# Patient Record
Sex: MALE | Race: WHITE
[De-identification: names, ages, dates, MRNs, and addresses within clinical notes are randomized per-mention and may not be internally consistent; named-entity substitution may affect disease eponyms.]

---
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
## 2021-08-01 NOTE — ED BACK PAIN
General


Stated Complaint:  BACK PAIN/EXPOSED TO COVID


Source of Information:  Patient





History of Present Illness


Date Seen by Provider:  Aug 1, 2021





Allergies and Home Medications


Allergies


Coded Allergies:  


     No Known Drug Allergies (Verified , 8/26/09)





Past Medical-Social-Family Hx


Past Medical History


Reproductive Disorders:  No


Sexually Transmitted Disease:  No


ADD/ADHD





Physical Exam


Vital Signs





Vital Signs - First Documented








 8/1/21





 19:18


 


Temp 36.2


 


Pulse 98


 


Resp 18


 


B/P (MAP) 120/77 (91)


 


Pulse Ox 95


 


O2 Delivery Room Air





Capillary Refill :


Height, Weight, BMI


Height: 6'5"


Weight: 250lbs. 6.0oz. 113.152703qm; 26.08 BMI


Method:Stated





Progress/Results/Core Measures


Results/Orders


Lab Results





Laboratory Tests








Test


 8/1/21


19:29 Range/Units


 


 


Influenza Type A (RT-PCR) Not Detected  Not Detecte  


 


Influenza Type B (RT-PCR) Not Detected  Not Detecte  


 


SARS-CoV-2 RNA (RT-PCR) Not Detected  Not Detecte  








My Orders





Orders - NABILA GRIFFIN


Covid 19 Inhouse Test (8/1/21 19:16)


Influenza A And B By Pcr (8/1/21 19:16)


Orphenadrine Inj (Ed Only) (Norflex Inje (8/1/21 20:00)


Ketorolac Injection (Toradol Injection) (8/1/21 20:00)





Medications Given in ED





Current Medications








 Medications  Dose


 Ordered  Sig/Blessing


 Route  Start Time


 Stop Time Status Last Admin


Dose Admin


 


 Ketorolac


 Tromethamine  60 mg  ONCE  ONCE


 IM  8/1/21 20:00


 8/1/21 20:01 DC 8/1/21 19:56


60 MG


 


 Orphenadrine


 Citrate  60 mg  ONCE  ONCE


 IM  8/1/21 20:00


 8/1/21 20:01 DC 8/1/21 19:56


60 MG








Vital Signs/I&O











 8/1/21





 19:18


 


Temp 36.2


 


Pulse 98


 


Resp 18


 


B/P (MAP) 120/77 (91)


 


Pulse Ox 95


 


O2 Delivery Room Air











Departure


Impression





   Primary Impression:  


   Back pain


Disposition:  01 HOME, SELF-CARE


Condition:  Improved





Departure-Patient Inst.


Decision time for Depature:  20:15


Referrals:  


NO,LOCAL PHYSICIAN (PCP/Family)


Primary Care Physician


Patient Instructions:  Low Back Pain  (DC)





Add. Discharge Instructions:  


Plan: 


1. Follow up with [facility] tomorrow as previous scheduled. 


2. May take Flexeril as needed every 8 hours for pain. 


3. Return for any new, concerning, or worsening symptoms.


Scripts


Cyclobenzaprine HCl (Cyclobenzaprine HCl) 10 Mg Tablet


10 MG PO Q8H PRN for SPASMS, #10 TAB 0 Refills


   Prov: NABILA GRIFFIN         8/1/21











NABILA GRIFFIN            Aug 1, 2021 19:18

## 2021-09-21 NOTE — ED COUGH/URI
General


Chief Complaint:  Cough/Cold/Flu Symptoms


Stated Complaint:  THROAT/LIPS/EYES SWELLING, CONGESTION, COUGH


Source:  patient


Exam Limitations:  no limitations


 (PETEY FITCH)





History of Present Illness


Date Seen by Provider:  Sep 21, 2021


Time Seen by Provider:  18:43


Initial Comments


To ER with a sore throat for 2 days with body aches.  Was tested at [facility] for Covid and was negative.  Has had fevers.  No cough no nausea no

vomiting no diarrhea


Timing/Duration:  constant


Severity/Quality:  moderate


 (PETEY FITCH)





Allergies and Home Medications


Allergies


Coded Allergies:  


     No Known Drug Allergies (Verified , 8/26/09)





Patient Home Medication List


Home Medication List Reviewed:  Yes


 (PETEY FITCH)


Amoxicillin (Amoxicillin) 500 Mg Capsule, 500 MG PO TID


   Prescribed by: PETEY FITCH on 9/21/21 1845


Cyclobenzaprine HCl (Cyclobenzaprine HCl) 10 Mg Tablet, 10 MG PO Q8H PRN for 

SPASMS


   Prescribed by: NABILA GRIFFIN on 8/1/21 2017


Prednisone (Prednisone) 20 Mg Tab, 40 MG PO DAILY


   Prescribed by: PETEY FITCH on 9/21/21 1845





Review of Systems


Review of Systems


Constitutional:  see HPI, fever


EENTM:  see HPI, throat pain


Respiratory:  no symptoms reported


Cardiovascular:  no symptoms reported


Genitourinary:  no symptoms reported


Musculoskeletal:  no symptoms reported


Skin:  no symptoms reported


Psychiatric/Neurological:  No Symptoms Reported


Hematologic/Lymphatic:  No Symptoms Reported


Immunological/Allergic:  no symptoms reported (PETEY FITCH)





Past Medical-Social-Family Hx


Past Medical History


Surgery/Hospitalization HX:  


denies


Reproductive Disorders:  No


Sexually Transmitted Disease:  No


ADD/ADHD


 (PETEY FITCH)





Physical Exam





Vital Signs - First Documented








 9/21/21





 18:35


 


Temp 36.6


 


Pulse 95


 


Resp 16


 


B/P (MAP) 135/83 (100)


 


Pulse Ox 96


 


O2 Delivery Room Air








 (RAMOSOLGA LIDIA K DO)


Capillary Refill :  


 (PETEY FITCH)


Height: 6'5"


Weight: 250lbs. 6.0oz. 113.064594yu; 31.00 BMI


Method:Stated


General Appearance:  WD/WN, no apparent distress


Eyes:  Bilateral Eye Normal Inspection, Bilateral Eye PERRL, Bilateral Eye EOMI


HEENT:  PERRL/EOMI, normal ENT inspection, other (Pharyngeal erythema with 

exudate)


Neck:  lymphadenopathy (R), lymphadenopathy (L)


Respiratory:  normal breath sounds, no respiratory distress, no accessory muscle

use


Cardiovascular:  regular rate, rhythm, no murmur


Gastrointestinal:  normal bowel sounds, non tender, soft


Extremities:  normal range of motion, non-tender


Neurologic/Psychiatric:  alert, normal mood/affect, oriented x 3


Skin:  normal color, warm/dry (PETEY IFTCH)





Progress/Results/Core Measures


Suspected Sepsis


SIRS


Temperature: 


Pulse:  


Respiratory Rate: 


 


Blood Pressure  / 


Mean: 


 


 (PETEY FITCH)





Results/Orders


Lab Results





Laboratory Tests








Test


 9/21/21


18:40 9/21/21


18:55 Range/Units


 


 


Influenza Type A Antigen NEGATIVE   NEGATIVE  


 


Influenza Type B Antigen NEGATIVE   NEGATIVE  


 


Group A Streptococcus Screen  NEGATIVE  NEGATIVE  





 (OLGA LIDIA BEASLEY DO)


Micro Results





Microbiology


9/21/21 Throat Culture - Preliminary, Resulted


          No Beta Strep isolated


 (OLGA LIDIA BEASLEY DO)


Vital Signs/I&O











 9/21/21 9/21/21





 18:35 19:39


 


Temp 36.6 36.5


 


Pulse 95 89


 


Resp 16 16


 


B/P (MAP) 135/83 (100) 135/82


 


Pulse Ox 96 95


 


O2 Delivery Room Air Room Air








 (OLGA LIDIA BEASLEY DO)


Vital Signs/I&O


Capillary Refill :  


 (PETEY FITCH)





Departure


Impression





   Primary Impression:  


   Pharyngitis


Disposition:  01 HOME, SELF-CARE


Condition:  Stable





Departure-Patient Inst.


Decision time for Depature:  18:44


 (PETEY FITCH)


Referrals:  


NO,LOCAL PHYSICIAN (PCP/Family)


Primary Care Physician


Patient Instructions:  Sore Throat, Adult ED





Add. Discharge Instructions:  


1.  Medication as directed


2.  Return to ER for any concerns


3.  Follow-up with your doctor later this week for recheck.





All discharge instructions reviewed with patient and/or family. Voiced 

understanding.


Scripts


Prednisone (Prednisone) 20 Mg Tab


40 MG PO DAILY, #6 TAB 0 Refills


   Prov: PETEY FITCH         9/21/21 


Amoxicillin (Amoxicillin) 500 Mg Capsule


500 MG PO TID, #21 CAP 0 Refills


   Prov: PETEY FITCH         9/21/21








ATTENDING PHYSICIAN NOTE:


I WAS PHYSICALLY PRESENT AS ER PHYSICIAN WHEN THIS PATIENT WAS IN ER, BUT I WAS 

NOT INVOLVED IN DECISION MAKING OR ANY CARE OF THIS PATIENT. 


 (OLGA LIDIA BEASLEY DO)











PETEY FITCH             Sep 21, 2021 18:45


OLGA LIDIA BEASLEY DO                 Sep 23, 2021 06:35

## 2021-09-23 NOTE — ED EENT
History of Present Illness


General


Chief Complaint:  Oral/Throat Problems


Stated Complaint:  DX: THRUSH / ANXIETY


Nursing Triage Note:  


PT AMB TO [facility] W C/O LIPS, TONGUE, AND MOUTH SWELLING. REPORTS HE WAS DX W 


THRUSH.


Source:  patient


Exam Limitations:  no limitations





History of Present Illness


Date Seen by Provider:  Sep 23, 2021


Time Seen by Provider:  18:25


Initial Comments


To ER with reports of lips tongue and mouth pain and swelling.  I saw him here 

in the ER 2 days ago at that time he had some erythema to the tonsillar pillars 

and soft palate.  Nothing to the tongue or buccal mucosa.  I gave him 

amoxicillin and prednisone.  He filled that yesterday.  He also was seen at 

[facility] and was diagnosed with thrush and was given some liquid for 

that.  He comes in today for worsening symptoms to the point that he is unable 

to swallow because of the pain.


Timing/Duration:  gradual


Severity:  moderate


Location:  mouth


Prearrival Treatment:  no prearrival treatment


Associated Symptoms:  denies symptoms





Allergies and Home Medications


Allergies


Coded Allergies:  


     No Known Drug Allergies (Verified , 8/26/09)





Patient Home Medication List


Home Medication List Reviewed:  Yes


Amoxicillin (Amoxicillin) 500 Mg Capsule, 500 MG PO TID


   Prescribed by: PETEY FITCH on 9/21/21 1845


Cyclobenzaprine HCl (Cyclobenzaprine HCl) 10 Mg Tablet, 10 MG PO Q8H PRN for 

SPASMS


   Prescribed by: NABILA GRIFFIN on 8/1/21 2017


Prednisone (Prednisone) 20 Mg Tab, 40 MG PO DAILY


   Prescribed by: PETEY FITCH on 9/21/21 1845


Valacyclovir HCl (Valtrex) 1,000 Mg Tablet, 1,000 MG PO BID


   Prescribed by: PETEY FITCH on 9/23/21 1833





Review of Systems


Review of Systems


Constitutional:  see HPI


Eyes:  No Symptoms Reported


Ears:  No Symptoms Reported


Nose:  no symptoms reported


Mouth:  see HPI


Throat:  see HPI


Respiratory:  no symptoms reported


Cardiovascular:  no symptoms reported


Musculoskeletal:  no symptoms reported





Past Medical-Social-Family Hx


Patient Social History


Tobacco Use?:  Yes


Tobacco type used:  Cigarettes


Smoking Status:  Current Everyday Smoker


Use of E-Cig and/or Vaping dev:  No


Substance use?:  No


Alcohol Use?:  No





Past Medical History


Surgery/Hospitalization HX:  


denies


Reproductive Disorders:  No


Sexually Transmitted Disease:  No


ADD/ADHD





Physical Exam


Vital Signs





Vital Signs - First Documented








 9/23/21





 17:57


 


Temp 35.9


 


Pulse 118


 


Resp 18


 


B/P (MAP) 133/83 (100)


 


Pulse Ox 97


 


O2 Delivery Room Air








Height, Weight, BMI


Height: 6'5"


Weight: 250lbs. 6.0oz. 113.442022vl; 29.00 BMI


Method:Stated


General Appearance:  WD/WN, no apparent distress


Eyes:  bilateral eye normal inspection, bilateral eye PERRL, bilateral eye EOMI


Ears:  bilateral ear auricle normal, bilateral ear canal normal, bilateral ear 

TM normal


Mouth/Throat:  other (Does have continued erythema of the tonsillar pillars and 

soft palate with some ulcerative lesions within this.  He does have some 

erythema and whitish material on the tongue consistent with oral candidiasis.  )


Neck:  non-tender, full range of motion, lymphadenopathy (R), lymphadenopathy 

(L)


Cardiovascular:  no murmur, tachycardia


Respiratory:  no respiratory distress, no accessory muscle use


Gastrointestinal:  normal bowel sounds, non tender


Neurologic/Psychiatric:  alert, normal mood/affect, oriented x 3





Progress/Results/Core Measures


Results/Orders


Lab Results





Laboratory Tests








Test


 9/23/21


18:30 Range/Units


 


 


White Blood Count


 10.8 


 4.3-11.0


10^3/uL


 


Red Blood Count


 5.45 


 4.30-5.52


10^6/uL


 


Hemoglobin 15.9  13.3-17.7  g/dL


 


Hematocrit 48  40-54  %


 


Mean Corpuscular Volume 89  80-99  fL


 


Mean Corpuscular Hemoglobin 29  25-34  pg


 


Mean Corpuscular Hemoglobin


Concent 33 


 32-36  g/dL





 


Red Cell Distribution Width 12.7  10.0-14.5  %


 


Platelet Count


 352 


 130-400


10^3/uL


 


Mean Platelet Volume 8.7 L 9.0-12.2  fL


 


Immature Granulocyte % (Auto) 1   %


 


Neutrophils (%) (Auto) 63  42-75  %


 


Lymphocytes (%) (Auto) 18  12-44  %


 


Monocytes (%) (Auto) 15 H 0-12  %


 


Eosinophils (%) (Auto) 3  0-10  %


 


Basophils (%) (Auto) 1  0-10  %


 


Neutrophils # (Auto)


 6.7 


 1.8-7.8


10^3/uL


 


Lymphocytes # (Auto)


 1.9 


 1.0-4.0


10^3/uL


 


Monocytes # (Auto)


 1.7 H


 0.0-1.0


10^3/uL


 


Eosinophils # (Auto)


 0.3 


 0.0-0.3


10^3/uL


 


Basophils # (Auto)


 0.1 


 0.0-0.1


10^3/uL


 


Immature Granulocyte # (Auto)


 0.1 


 0.0-0.1


10^3/uL


 


Sodium Level 138  135-145  MMOL/L


 


Potassium Level 4.2  3.6-5.0  MMOL/L


 


Chloride Level 103    MMOL/L


 


Carbon Dioxide Level 22  21-32  MMOL/L


 


Anion Gap 13  5-14  MMOL/L


 


Blood Urea Nitrogen 16  7-18  MG/DL


 


Creatinine


 0.93 


 0.60-1.30


MG/DL


 


Estimat Glomerular Filtration


Rate 94 


  





 


BUN/Creatinine Ratio 17   


 


Glucose Level 108 H   MG/DL


 


Calcium Level 9.4  8.5-10.1  MG/DL


 


Corrected Calcium 9.6  8.5-10.1  MG/DL


 


Total Bilirubin 0.4  0.1-1.0  MG/DL


 


Aspartate Amino Transf


(AST/SGOT) 19 


 5-34  U/L





 


Alanine Aminotransferase


(ALT/SGPT) 32 


 0-55  U/L





 


Alkaline Phosphatase 81    U/L


 


C-Reactive Protein High


Sensitivity 3.45 H


 0.00-0.50


MG/DL


 


Total Protein 7.3  6.4-8.2  GM/DL


 


Albumin 3.8  3.2-4.5  GM/DL








My Orders





Orders - PETEY FITCH APRN


Cbc With Automated Diff (9/23/21 18:17)


Hs C Reactive Protein (9/23/21 18:17)


Comprehensive Metabolic Panel (9/23/21 18:17)


Ed Iv/Invasive Line Start (9/23/21 18:17)


Lactated Ringers (Lr 1000 Ml Iv Solution (9/23/21 18:30)


Ketorolac Injection (Toradol Injection) (9/23/21 18:30)


Lidocaine 2% Viscous 15 Ml (Xylocaine Vi (9/23/21 18:30)





Medications Given in ED





Current Medications








 Medications  Dose


 Ordered  Sig/Blessing


 Route  Start Time


 Stop Time Status Last Admin


Dose Admin


 


 Ketorolac


 Tromethamine  15 mg  ONCE  ONCE


 IVP  9/23/21 18:30


 9/23/21 18:31 DC 9/23/21 18:38


15 MG


 


 Lidocaine HCl  15 ml  ONCE  ONCE


 PO  9/23/21 18:30


 9/23/21 18:31 DC 9/23/21 18:38


15 ML








Vital Signs/I&O











 9/23/21





 17:57


 


Temp 35.9


 


Pulse 118


 


Resp 18


 


B/P (MAP) 133/83 (100)


 


Pulse Ox 97


 


O2 Delivery Room Air














Blood Pressure Mean:                    100











Departure


Impression





   Primary Impression:  


   Viral pharyngitis


Disposition:  01 HOME, SELF-CARE


Condition:  Stable





Departure-Patient Inst.


Decision time for Depature:  18:32


Referrals:  


[facility] (PCP/Family)


Primary Care Physician


Patient Instructions:  Viral Pharyngitis  (DC)





Add. Discharge Instructions:  








All discharge instructions reviewed with patient and/or family. Voiced 

understanding.


Scripts


Valacyclovir HCl (Valtrex) 1,000 Mg Tablet


1000 MG PO BID, #14 TAB


   Prov: PETEY FITCH         9/23/21











PETEY FITCH             Sep 23, 2021 18:27
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
## 2022-01-02 NOTE — ED GENERAL
General


Chief Complaint:  General Problems/Pain


Stated Complaint:  LIPS SWOLLEN, UNABLE TO EAT


Nursing Triage Note:  


PATIENT WAS BROUGHT TO ROOM FROM WAITING  ROOM WHERE HE HAD BEEN YELLING THAT 


HIS TROAT WAS  CLOSING OFF. ON ADMIT APPEARS TO BE HEYPRVENTLATING. LIPS SWOLLEN




REPORTS DX WITH HERPES AND PUT ON VALTREX  REORTS NOT ANY BETTER WAS AT [facility] AND 


LEFT BECAUSE HE WAS NOT SEE FAST ENOUGHT.





History of Present Illness


Date Seen by Provider:  Jan 2, 2022


Time Seen by Provider:  17:00


Initial Comments


36-year-old  male patient presents for pain on his lips and mouth due 

to herpetic exacerbation.  Patient is yelling and moaning about his pain.  He 

was at [facility] prior to arrival and came here because of the wait time there.  He 

had been started on Valtrex by them 4 days ago and took his last pill yesterday.

 He has not taken any ibuprofen or Tylenol for the symptoms.  He denies any 

other complaints.  He is able to take with water and swallow with no discomfort.


Timing/Duration:  12-24 Hours


Severity:  Moderate


Associated Systoms:  Denies Symptoms





Allergies and Home Medications


Allergies


Coded Allergies:  


     No Known Drug Allergies (Verified , 8/26/09)





Patient Home Medication List


Home Medication List Reviewed:  Yes


Amoxicillin (Amoxicillin) 500 Mg Capsule, 500 MG PO TID


   Prescribed by: PETEY FITCH on 9/21/21 1845


Cyclobenzaprine HCl (Cyclobenzaprine HCl) 10 Mg Tablet, 10 MG PO Q8H PRN for 

SPASMS


   Prescribed by: NABILA GRIFFIN on 8/1/21 2017


Prednisone (Prednisone) 20 Mg Tab, 40 MG PO DAILY


   Prescribed by: PETEY FITCH on 9/21/21 1845


Valacyclovir HCl (Valtrex) 1,000 Mg Tablet, 1,000 MG PO BID


   Prescribed by: PETEY FITCH on 9/23/21 1833





Review of Systems


Review of Systems


Constitutional:  no symptoms reported, see HPI


EENTM:  see HPI, mouth pain, mouth swelling, throat pain


Respiratory:  no symptoms reported, see HPI


Genitourinary:  see HPI, pain (due to herpetic lesions)





All Other Systems Reviewed


Negative Unless Noted:  Yes





Past Medical-Social-Family Hx


Patient Social History


Tobacco Use?:  Yes


Substance use?:  Yes


Substance type:  Methamphetamine


Alcohol Use?:  Yes





Past Medical History


Surgery/Hospitalization HX:  


denies


Reproductive Disorders:  No


Sexually Transmitted Disease:  No


ADD/ADHD





Family Medical History


Reviewed Nursing Family Hx





Physical Exam


Vital Signs





Vital Signs - First Documented








 1/2/22 1/2/22





 16:36 17:27


 


Temp 35.1 


 


Pulse 90 


 


Resp 18 


 


B/P (MAP) 154/92 (112) 


 


Pulse Ox 98 


 


O2 Delivery  Room Air





Capillary Refill : Less Than 3 Seconds


Height, Weight, BMI


Height: 6'5"


Weight: 250lbs. 6.0oz. [id number]; 25.00 BMI


Method:Stated


General Appearance:  Anxious, Moderate Distress


Eyes:  Bilateral Eye Normal Inspection, Bilateral Eye PERRL, Bilateral Eye EOMI


HEENT:  PERRL/EOMI, Pharynx Normal, Moist Mucous Membranes, Other (mild swelling

with lesions compatible to herpes noted to lips and mouth)


Neck:  Full Range of Motion, Normal Inspection, Non Tender, Supple


Respiratory:  Chest Non Tender, Lungs Clear, Normal Breath Sounds


Cardiovascular:  Regular Rate, Rhythm, No Murmur, Normal Peripheral Pulses


Neurologic/Psychiatric:  Alert, Oriented x3, No Motor/Sensory Deficits





Progress/Results/Core Measures


Suspected Sepsis


SIRS


Temperature: 


Pulse: 90 


Respiratory Rate: 18


 


Blood Pressure 154 /92 


Mean: 72





Results/Orders


My Orders





Orders - JON MAN


Valacyclovir Tablet (Valtrex Tablet) (1/2/22 17:39)


Acetaminophen Tablet/Caplet (Tylenol  T (1/2/22 17:41)





Vital Signs/I&O











 1/2/22 1/2/22 1/2/22





 16:36 17:27 17:45


 


Temp 35.1  


 


Pulse 90 95 90


 


Resp 18 18 18


 


B/P (MAP) 154/92 (112) 122/48 122/48


 


Pulse Ox 98 98 98


 


O2 Delivery  Room Air Room Air





Capillary Refill : Less Than 3 Seconds








Blood Pressure Mean:                    72








Progress Note :  


   Time:  17:00


Progress Note


Upon entering patients room, he had his penis exposed and was rubbing it, he was

yelling about the herpes pain causing so much discomfort, but rubbing it was 

helping with the pain. Patient instructed to keep his pants on appropriately and

if he continued to have indecent exposure, he would be asked to leave the ED. He

continued to yell and moan about the pain. Agreed to provide Valtrex but he 

refused to get a Rx sent to the pharmacy, as he needs his Rx at [facility]. 

Patient asking if he could leave the ED and go to [facility] for his medications upon 

discharge. 


1735 registration staff and ED tech went to room, he had his pants partially 

removed and was exposing himself, refused to cover himself with a blanket or 

keep his pants on appropriately. He was yelling and cursing at the staff. 

Nursing staff obtaining medications and patient discharge papers. 


1740 patient ambulated out of room, cursing about lack of care he has received 

and walked out of AMA. He refused to sign forms or take his medications that the

nurse was attempting to give him. He did not take his Discharge Instructions.





Departure


Impression





   Primary Impression:  


   Herpes genitalis


   Qualified Codes:  A60.01 - Herpesviral infection of penis


   Additional Impression:  


   Oral herpes


Disposition:  07 AGAINST MEDICAL ADVICE


Condition:  Stable





Departure-Patient Inst.


Decision time for Depature:  17:40


Referrals:  


[facility] (PCP/Family)


Primary Care Physician


Patient Instructions:  Cold Sores (Oral Herpes) (DC), Genital Herpes (DC)





Add. Discharge Instructions:  


Follow-up at [facility] for continuation of your medication. 


Use Tylenol and over-the-counter herpetic medication for symptoms.


Return to the emergency department for new, urgent healthcare needs








All discharge instructions reviewed with patient and/or family. Voiced 

understanding.





Copy


Copies To 1:   SAMEER MCKINNON AMY ARNP                   Jan 2, 2022 17:46

## 2022-05-20 ENCOUNTER — HOSPITAL ENCOUNTER (OUTPATIENT)
Dept: HOSPITAL 75 - ER | Age: 37
Setting detail: OBSERVATION
LOS: 1 days | Discharge: HOME | End: 2022-05-21
Attending: INTERNAL MEDICINE | Admitting: INTERNAL MEDICINE
Payer: SELF-PAY

## 2022-05-20 VITALS — SYSTOLIC BLOOD PRESSURE: 106 MMHG | DIASTOLIC BLOOD PRESSURE: 62 MMHG

## 2022-05-20 VITALS — SYSTOLIC BLOOD PRESSURE: 109 MMHG | DIASTOLIC BLOOD PRESSURE: 72 MMHG

## 2022-05-20 VITALS — HEIGHT: 73.62 IN | BODY MASS INDEX: 38.32 KG/M2 | WEIGHT: 295.42 LBS

## 2022-05-20 VITALS — DIASTOLIC BLOOD PRESSURE: 65 MMHG | SYSTOLIC BLOOD PRESSURE: 110 MMHG

## 2022-05-20 VITALS — SYSTOLIC BLOOD PRESSURE: 109 MMHG | DIASTOLIC BLOOD PRESSURE: 79 MMHG

## 2022-05-20 VITALS — DIASTOLIC BLOOD PRESSURE: 67 MMHG | SYSTOLIC BLOOD PRESSURE: 107 MMHG

## 2022-05-20 VITALS — SYSTOLIC BLOOD PRESSURE: 123 MMHG | DIASTOLIC BLOOD PRESSURE: 80 MMHG

## 2022-05-20 VITALS — DIASTOLIC BLOOD PRESSURE: 67 MMHG | SYSTOLIC BLOOD PRESSURE: 105 MMHG

## 2022-05-20 VITALS — SYSTOLIC BLOOD PRESSURE: 106 MMHG | DIASTOLIC BLOOD PRESSURE: 63 MMHG

## 2022-05-20 VITALS — DIASTOLIC BLOOD PRESSURE: 77 MMHG | SYSTOLIC BLOOD PRESSURE: 119 MMHG

## 2022-05-20 VITALS — DIASTOLIC BLOOD PRESSURE: 79 MMHG | SYSTOLIC BLOOD PRESSURE: 116 MMHG

## 2022-05-20 VITALS — DIASTOLIC BLOOD PRESSURE: 68 MMHG | SYSTOLIC BLOOD PRESSURE: 101 MMHG

## 2022-05-20 DIAGNOSIS — F12.10: ICD-10-CM

## 2022-05-20 DIAGNOSIS — F15.10: ICD-10-CM

## 2022-05-20 DIAGNOSIS — Z20.822: ICD-10-CM

## 2022-05-20 DIAGNOSIS — G93.1: ICD-10-CM

## 2022-05-20 DIAGNOSIS — F19.10: ICD-10-CM

## 2022-05-20 DIAGNOSIS — R09.02: ICD-10-CM

## 2022-05-20 DIAGNOSIS — Z79.52: ICD-10-CM

## 2022-05-20 DIAGNOSIS — J98.11: ICD-10-CM

## 2022-05-20 DIAGNOSIS — T43.621A: Primary | ICD-10-CM

## 2022-05-20 DIAGNOSIS — F14.10: ICD-10-CM

## 2022-05-20 LAB
ALBUMIN SERPL-MCNC: 4.2 GM/DL (ref 3.2–4.5)
ALP SERPL-CCNC: 96 U/L (ref 40–136)
ALT SERPL-CCNC: 39 U/L (ref 0–55)
AMORPH SED URNS QL MICRO: (no result) /LPF
APAP SERPL-MCNC: < 10 UG/ML (ref 10–30)
APTT BLD: 21 SEC (ref 24–35)
APTT PPP: YELLOW S
BACTERIA #/AREA URNS HPF: (no result) /HPF
BARBITURATES UR QL: NEGATIVE
BASOPHILS # BLD AUTO: 0.1 10^3/UL (ref 0–0.1)
BASOPHILS NFR BLD AUTO: 0 % (ref 0–10)
BASOPHILS NFR BLD MANUAL: 0 %
BENZODIAZ UR QL SCN: NEGATIVE
BILIRUB SERPL-MCNC: 0.3 MG/DL (ref 0.1–1)
BILIRUB UR QL STRIP: NEGATIVE
BUN/CREAT SERPL: 15
CALCIUM SERPL-MCNC: 9.4 MG/DL (ref 8.5–10.1)
CHLORIDE SERPL-SCNC: 106 MMOL/L (ref 98–107)
CO2 SERPL-SCNC: 24 MMOL/L (ref 21–32)
COCAINE UR QL: NEGATIVE
CREAT SERPL-MCNC: 1.27 MG/DL (ref 0.6–1.3)
EOSINOPHIL # BLD AUTO: 0.1 10^3/UL (ref 0–0.3)
EOSINOPHIL NFR BLD AUTO: 1 % (ref 0–10)
EOSINOPHIL NFR BLD MANUAL: 3 %
FIBRINOGEN PPP-MCNC: CLEAR MG/DL
GFR SERPLBLD BASED ON 1.73 SQ M-ARVRAT: 76 ML/MIN
GLUCOSE SERPL-MCNC: 134 MG/DL (ref 70–105)
GLUCOSE UR STRIP-MCNC: NEGATIVE MG/DL
GRAN CASTS #/AREA URNS LPF: (no result) /LPF
HCT VFR BLD CALC: 48 % (ref 40–54)
HGB BLD-MCNC: 15 G/DL (ref 13.3–17.7)
INR PPP: 0.9 (ref 0.8–1.4)
KETONES UR QL STRIP: NEGATIVE
LEUKOCYTE ESTERASE UR QL STRIP: NEGATIVE
LYMPHOCYTES # BLD AUTO: 0.8 10^3/UL (ref 1–4)
LYMPHOCYTES NFR BLD AUTO: 6 % (ref 12–44)
MAGNESIUM SERPL-MCNC: 2.4 MG/DL (ref 1.6–2.4)
MANUAL DIFFERENTIAL PERFORMED BLD QL: YES
MCH RBC QN AUTO: 29 PG (ref 25–34)
MCHC RBC AUTO-ENTMCNC: 31 G/DL (ref 32–36)
MCV RBC AUTO: 92 FL (ref 80–99)
METHADONE UR QL SCN: NEGATIVE
MONOCYTES # BLD AUTO: 1 10^3/UL (ref 0–1)
MONOCYTES NFR BLD AUTO: 7 % (ref 0–12)
MONOCYTES NFR BLD: 8 %
NEUTROPHILS # BLD AUTO: 12.4 10^3/UL (ref 1.8–7.8)
NEUTROPHILS NFR BLD AUTO: 86 % (ref 42–75)
NEUTS BAND NFR BLD MANUAL: 80 %
NEUTS BAND NFR BLD: 4 %
NITRITE UR QL STRIP: NEGATIVE
OPIATES UR QL SCN: NEGATIVE
OXYCODONE UR QL: NEGATIVE
PH UR STRIP: 6 [PH] (ref 5–9)
PLATELET # BLD: 309 10^3/UL (ref 130–400)
PMV BLD AUTO: 8.6 FL (ref 9–12.2)
POTASSIUM SERPL-SCNC: 4 MMOL/L (ref 3.6–5)
PROPOXYPH UR QL: NEGATIVE
PROT SERPL-MCNC: 7.3 GM/DL (ref 6.4–8.2)
PROT UR QL STRIP: (no result)
PROTHROMBIN TIME: 12.4 SEC (ref 12.2–14.7)
RBC #/AREA URNS HPF: (no result) /HPF
RBC MORPH BLD: NORMAL
SALICYLATES SERPL-MCNC: < 5 MG/DL (ref 5–20)
SODIUM SERPL-SCNC: 143 MMOL/L (ref 135–145)
SP GR UR STRIP: >=1.03 (ref 1.02–1.02)
SQUAMOUS #/AREA URNS HPF: (no result) /HPF
TRICYCLICS UR QL SCN: NEGATIVE
VARIANT LYMPHS NFR BLD MANUAL: 5 %
WBC # BLD AUTO: 14.5 10^3/UL (ref 4.3–11)
WBC #/AREA URNS HPF: (no result) /HPF

## 2022-05-20 PROCEDURE — 99285 EMERGENCY DEPT VISIT HI MDM: CPT

## 2022-05-20 PROCEDURE — 93041 RHYTHM ECG TRACING: CPT

## 2022-05-20 PROCEDURE — 83605 ASSAY OF LACTIC ACID: CPT

## 2022-05-20 PROCEDURE — 36415 COLL VENOUS BLD VENIPUNCTURE: CPT

## 2022-05-20 PROCEDURE — 85610 PROTHROMBIN TIME: CPT

## 2022-05-20 PROCEDURE — 82550 ASSAY OF CK (CPK): CPT

## 2022-05-20 PROCEDURE — 87636 SARSCOV2 & INF A&B AMP PRB: CPT

## 2022-05-20 PROCEDURE — 80320 DRUG SCREEN QUANTALCOHOLS: CPT

## 2022-05-20 PROCEDURE — 85025 COMPLETE CBC W/AUTO DIFF WBC: CPT

## 2022-05-20 PROCEDURE — 93005 ELECTROCARDIOGRAM TRACING: CPT

## 2022-05-20 PROCEDURE — 85007 BL SMEAR W/DIFF WBC COUNT: CPT

## 2022-05-20 PROCEDURE — 87088 URINE BACTERIA CULTURE: CPT

## 2022-05-20 PROCEDURE — 87205 SMEAR GRAM STAIN: CPT

## 2022-05-20 PROCEDURE — 72125 CT NECK SPINE W/O DYE: CPT

## 2022-05-20 PROCEDURE — 85730 THROMBOPLASTIN TIME PARTIAL: CPT

## 2022-05-20 PROCEDURE — 83874 ASSAY OF MYOGLOBIN: CPT

## 2022-05-20 PROCEDURE — 71045 X-RAY EXAM CHEST 1 VIEW: CPT

## 2022-05-20 PROCEDURE — 96376 TX/PRO/DX INJ SAME DRUG ADON: CPT

## 2022-05-20 PROCEDURE — 87070 CULTURE OTHR SPECIMN AEROBIC: CPT

## 2022-05-20 PROCEDURE — 81000 URINALYSIS NONAUTO W/SCOPE: CPT

## 2022-05-20 PROCEDURE — 85027 COMPLETE CBC AUTOMATED: CPT

## 2022-05-20 PROCEDURE — 87040 BLOOD CULTURE FOR BACTERIA: CPT

## 2022-05-20 PROCEDURE — 70450 CT HEAD/BRAIN W/O DYE: CPT

## 2022-05-20 PROCEDURE — 84484 ASSAY OF TROPONIN QUANT: CPT

## 2022-05-20 PROCEDURE — 84145 PROCALCITONIN (PCT): CPT

## 2022-05-20 PROCEDURE — 84100 ASSAY OF PHOSPHORUS: CPT

## 2022-05-20 PROCEDURE — 80053 COMPREHEN METABOLIC PANEL: CPT

## 2022-05-20 PROCEDURE — 86141 C-REACTIVE PROTEIN HS: CPT

## 2022-05-20 PROCEDURE — 80306 DRUG TEST PRSMV INSTRMNT: CPT

## 2022-05-20 PROCEDURE — 80329 ANALGESICS NON-OPIOID 1 OR 2: CPT

## 2022-05-20 PROCEDURE — 83735 ASSAY OF MAGNESIUM: CPT

## 2022-05-20 RX ADMIN — SODIUM CHLORIDE, SODIUM LACTATE, POTASSIUM CHLORIDE, AND CALCIUM CHLORIDE SCH MLS/HR: 600; 310; 30; 20 INJECTION, SOLUTION INTRAVENOUS at 23:48

## 2022-05-20 RX ADMIN — SODIUM CHLORIDE SCH MLS/HR: 900 INJECTION INTRAVENOUS at 18:51

## 2022-05-20 RX ADMIN — SODIUM CHLORIDE, SODIUM LACTATE, POTASSIUM CHLORIDE, AND CALCIUM CHLORIDE SCH MLS/HR: 600; 310; 30; 20 INJECTION, SOLUTION INTRAVENOUS at 14:30

## 2022-05-20 NOTE — DIAGNOSTIC IMAGING REPORT
INDICATION: Cough and hypoxia, found unresponsive.



Frontal chest obtained at 10:42 a.m. There is no prior study for

comparison.



FINDINGS: There is cardiomegaly. There is poor inspiration with

mild central vascular prominence. There is some minimal

atelectatic change or infiltrate in the left base.



IMPRESSION:



Poor inspiration with cardiomegaly and mild central vascular

prominence. Minimal infiltrate or atelectasis in left base. No

pneumothorax seen.



Dictated by: 



  Dictated on workstation # [id number]

## 2022-05-20 NOTE — ED NEUROLOGICAL PROBLEM
General


Chief Complaint:  Unresponsive


Stated Complaint:  UNRESPONSIVE


Nursing Triage Note:  


ARRIVED VIA EMS FROM HOME. FOUND UNRESPONSIVE SITTING ON THE FLOOR.  UPON 


ARRIVAL PT LETHARGIC ET WILL OPEN EYES AND ANSWER TO SOME QUESTIONS. EMS REPORTS




GIVING NARCAN IN ROUTE.


Source:  patient, police, EMS


Exam Limitations:  no limitations





History of Present Illness


Date Seen by Provider:  May 20, 2022


Time Seen by Provider:  10:10


Initial Comments


This 36-year-old man is brought to the emergency room by [facility] 

with chief complaint of unresponsive episode.  EMS reports he was found lying on

the floor face up with his head against a chair such that his neck was flexed 

anteriorly.  He was hypoxic with an oxygen saturation of 70% on room air and 

minimally responsive.  Narcan 2 mg IV was administered and nonrebreather mask 

was applied.  Mental status was improving, he was protecting his airway, and 

oxygen saturation improved.  Multiple injection syringes were found on the floor

around him and track marks were noted on his arms.  Police noted that this 

patient has been known to be involved with heroin, cocaine, methamphetamine, 

marijuana, fentanyl, etc.  Patient eventually wakes and talks to us during 

assessment.  He states he does not know what substance he injected.  He was not 

very responsive until we attempted to place a Quinteros catheter and then he became 

very talkative and responsive.  There is no known trauma but c-collar was placed

as a precaution.  Patient coughed up a significant clump of bloody green sputum.





Allergies and Home Medications


Allergies


Coded Allergies:  


     No Known Drug Allergies (Verified , 09)





Patient Home Medication List


Home Medication List Reviewed:  Yes


Amoxicillin (Amoxicillin) 500 Mg Capsule, 500 MG PO TID


   Prescribed by: PETEY FITCH on 21 184


Cyclobenzaprine HCl (Cyclobenzaprine HCl) 10 Mg Tablet, 10 MG PO Q8H PRN for 

SPASMS


   Prescribed by: NABILA GRIFFIN on 21


Prednisone (Prednisone) 20 Mg Tab, 40 MG PO DAILY


   Prescribed by: PETEY FITCH on 21 184


Valacyclovir HCl (Valtrex) 1,000 Mg Tablet, 1,000 MG PO BID


   Prescribed by: PETEY FITCH on 21 1833





Review of Systems


Review of Systems


Constitutional:  see HPI


Eyes:  No Symptoms Reported


Ears, Nose, Mouth, Throat:  no symptoms reported


Respiratory:  see HPI


Cardiovascular:  no symptoms reported


Gastrointestinal:  no symptoms reported


Genitourinary:  no symptoms reported


Musculoskeletal:  no symptoms reported


Skin:  no symptoms reported


Psychiatric/Neurological:  See HPI


Endocrine:  No Symptoms Reported


Hematologic/Lymphatic:  No Symptoms Reported





Past Medical-Social-Family Hx


Patient Social History


Tobacco Use?:  Yes


Substance use?:  Yes


Substance type:  Methamphetamine, Marijuana, Other (Heroin, cocaine)


Alcohol Use?:  No (Not known)





Past Medical History


Surgery/Hospitalization HX:  


denies


Surgeries:  Yes (Trauma laceration repair)


Respiratory:  No


Cardiac:  No


Neurological:  No


Reproductive Disorders:  No


Sexually Transmitted Disease:  No


Gastrointestinal:  No


Musculoskeletal:  No


Endocrine:  No


HEENT:  No


Cancer:  No


Psychosocial:  Yes (Polysubstance abuse)


ADD/ADHD





Physical Exam


Vital Signs





Vital Signs - First Documented








 22





 10:09 13:10


 


Temp 34.8 


 


Pulse 96 


 


Resp 13 


 


B/P (MAP) 114/59 (77) 


 


Pulse Ox 100 


 


O2 Delivery Non Rebreather 


 


O2 Flow Rate  2.00





Capillary Refill :


Height, Weight, BMI


Height: 6'5"


Weight: 250lbs. 6.0oz. [id number]; 35.00 BMI


Method:Stated


General Appearance:  WD/WN, no apparent distress, other (Decreased responsiven

ess)


HEENT:  PERRL/EOMI, normal ENT inspection


Neck:  non-tender, normal inspection


Respiratory:  lungs clear, normal breath sounds, no respiratory distress


Cardiovascular:  regular rate, rhythm, no edema, no murmur


Gastrointestinal:  normal bowel sounds, non tender, soft


Extremities:  normal inspection, no pedal edema


Neurologic/Psychiatric:  other (Decreased responsiveness but will awaken and 

converse when stimulated.  Patient became very verbal when we attempted to place

a Quinteros catheter.  Moves all extremities.  Rather somnolent.)


Crainal Nerves:  normal hearing, normal speech, PERRL


Motor/Sensory:  no motor deficit


Skin:  normal color, warm/dry





Focused Exam


Lactate Level


22 10:23: Lactic Acid Level 2.47*H





Lactic Acid Level





Laboratory Tests








Test


 22


10:23


 


Lactic Acid Level


 2.47 MMOL/L


(0.50-2.00)  *H











Progress/Results/Core Measures


Results/Orders


Lab Results





Laboratory Tests








Test


 22


10:20 22


10:23 22


10:54 22


12:56 Range/Units


 


 


Total Creatine Kinase 375 H      U/L


 


White Blood Count


 


 14.5 H


 


 


 4.3-11.0


10^3/uL


 


Red Blood Count


 


 5.22 


 


 


 4.30-5.52


10^6/uL


 


Hemoglobin  15.0    13.3-17.7  g/dL


 


Hematocrit  48    40-54  %


 


Mean Corpuscular Volume  92    80-99  fL


 


Mean Corpuscular Hemoglobin  29    25-34  pg


 


Mean Corpuscular Hemoglobin


Concent 


 31 L


 


 


 32-36  g/dL





 


Red Cell Distribution Width  12.9    10.0-14.5  %


 


Platelet Count


 


 309 


 


 


 130-400


10^3/uL


 


Mean Platelet Volume  8.6 L   9.0-12.2  fL


 


Immature Granulocyte % (Auto)  1     %


 


Neutrophils (%) (Auto)  86 H   42-75  %


 


Lymphocytes (%) (Auto)  6 L   12-44  %


 


Monocytes (%) (Auto)  7    0-12  %


 


Eosinophils (%) (Auto)  1    0-10  %


 


Basophils (%) (Auto)  0    0-10  %


 


Neutrophils # (Auto)


 


 12.4 H


 


 


 1.8-7.8


10^3/uL


 


Lymphocytes # (Auto)


 


 0.8 L


 


 


 1.0-4.0


10^3/uL


 


Monocytes # (Auto)


 


 1.0 


 


 


 0.0-1.0


10^3/uL


 


Eosinophils # (Auto)


 


 0.1 


 


 


 0.0-0.3


10^3/uL


 


Basophils # (Auto)


 


 0.1 


 


 


 0.0-0.1


10^3/uL


 


Immature Granulocyte # (Auto)


 


 0.1 


 


 


 0.0-0.1


10^3/uL


 


Neutrophils % (Manual)  80     %


 


Lymphocytes % (Manual)  5     %


 


Monocytes % (Manual)  8     %


 


Eosinophils % (Manual)  3     %


 


Basophils % (Manual)  0     %


 


Band Neutrophils  4     %


 


Blood Morphology Comment  NORMAL     


 


Prothrombin Time  12.4    12.2-14.7  SEC


 


INR Comment  0.9    0.8-1.4  


 


Activated Partial


Thromboplast Time 


 21 L


 


 


 24-35  SEC





 


Sodium Level  143    135-145  MMOL/L


 


Potassium Level  4.0    3.6-5.0  MMOL/L


 


Chloride Level  106      MMOL/L


 


Carbon Dioxide Level  24    21-32  MMOL/L


 


Anion Gap  13    5-14  MMOL/L


 


Blood Urea Nitrogen  19 H   7-18  MG/DL


 


Creatinine


 


 1.27 


 


 


 0.60-1.30


MG/DL


 


Estimat Glomerular Filtration


Rate 


 76 


 


 


  





 


BUN/Creatinine Ratio  15     


 


Glucose Level  134 H     MG/DL


 


Lactic Acid Level


 


 2.47 *H


 


 


 0.50-2.00


MMOL/L


 


Calcium Level  9.4    8.5-10.1  MG/DL


 


Corrected Calcium  9.2    8.5-10.1  MG/DL


 


Magnesium Level  2.4    1.6-2.4  MG/DL


 


Total Bilirubin  0.3    0.1-1.0  MG/DL


 


Aspartate Amino Transf


(AST/SGOT) 


 54 H


 


 


 5-34  U/L





 


Alanine Aminotransferase


(ALT/SGPT) 


 39 


 


 


 0-55  U/L





 


Alkaline Phosphatase  96      U/L


 


Myoglobin


 


 374.7 H


 


 


 10.0-92.0


NG/ML


 


Troponin I  < 0.028    <0.028  NG/ML


 


C-Reactive Protein High


Sensitivity 


 0.71 H


 


 


 0.00-0.50


MG/DL


 


Total Protein  7.3    6.4-8.2  GM/DL


 


Albumin  4.2    3.2-4.5  GM/DL


 


Procalcitonin  0.02    <0.10  NG/ML


 


Salicylates Level  < 5.0 L   5.0-20.0  MG/DL


 


Acetaminophen Level  < 10 L   10-30  UG/ML


 


Serum Alcohol  < 10    <10  MG/DL


 


Influenza Type A (RT-PCR)   Not Detected   Not Detecte  


 


Influenza Type B (RT-PCR)   Not Detected   Not Detecte  


 


SARS-CoV-2 RNA (RT-PCR)   Not Detected   Not Detecte  


 


Urine Color    YELLOW   


 


Urine Clarity    CLEAR   


 


Urine pH    6.0  5-9  


 


Urine Specific Gravity    >=1.030  1.016-1.022  


 


Urine Protein    2+ H NEGATIVE  


 


Urine Glucose (UA)    NEGATIVE  NEGATIVE  


 


Urine Ketones    NEGATIVE  NEGATIVE  


 


Urine Nitrite    NEGATIVE  NEGATIVE  


 


Urine Bilirubin    NEGATIVE  NEGATIVE  


 


Urine Urobilinogen    0.2  < = 1.0  MG/DL


 


Urine Leukocyte Esterase    NEGATIVE  NEGATIVE  


 


Urine RBC (Auto)    TRACE-I H NEGATIVE  


 


Urine RBC    2-5 H  /HPF


 


Urine WBC    2-5   /HPF


 


Urine Squamous Epithelial


Cells 


 


 


 RARE 


  /HPF





 


Urine Crystals    PRESENT H  /LPF


 


Urine Amorphous Sediment


 


 


 


 RARE LUIS ANTONIO


URATES H  /LPF





 


Urine Bacteria    TRACE   /HPF


 


Urine Casts    PRESENT   /LPF


 


Urine Granular Casts    RARE   /LPF


 


Urine Mucus    SMALL H  /LPF


 


Urine Culture Indicated


 


 


 


 CULTURE


PENDING  





 


Urine Opiates Screen    NEGATIVE  NEGATIVE  


 


Urine Oxycodone Screen    NEGATIVE  NEGATIVE  


 


Urine Methadone Screen    NEGATIVE  NEGATIVE  


 


Urine Propoxyphene Screen    NEGATIVE  NEGATIVE  


 


Urine Barbiturates Screen    NEGATIVE  NEGATIVE  


 


Ur Tricyclic Antidepressants


Screen 


 


 


 NEGATIVE 


 NEGATIVE  





 


Urine Phencyclidine Screen    NEGATIVE  NEGATIVE  


 


Urine Amphetamines Screen    POSITIVE H NEGATIVE  


 


Urine Methamphetamines Screen    POSITIVE H NEGATIVE  


 


Urine Benzodiazepines Screen    NEGATIVE  NEGATIVE  


 


Urine Cocaine Screen    NEGATIVE  NEGATIVE  


 


Urine Cannabinoids Screen    POSITIVE H NEGATIVE  








My Orders





Orders - KARIN MONROY MD


Naloxone  Injection (Narcan Injection) (22 10:16)


Cbc With Automated Diff (22 10:21)


Comprehensive Metabolic Panel (22 10:21)


Blood Culture (22 10:21)


Sputum Culture (22 10:21)


Urine Culture (22 10:21)


Protime With Inr (22 10:21)


Partial Thromboplastin Time (22 10:21)


Chest 1 View, Ap/Pa Only (22 10:21)


Ed Iv/Invasive Line Start (22 10:21)


Ed Iv/Invasive Line Start (22 10:21)


Vital Signs Adult Sepsis Patie Q15M (22 10:21)


O2 (22 10:21)


Remove Rings In Anticipation O (22 10:21)


Lactic Acid Analyzer (22 10:21)


Magnesium (22 10:22)


Ekg Tracing (22 10:22)


Myoglobin Serum (22 10:22)


Monitor-Rhythm Ecg Trace Only (22 10:22)


Troponin I Herb (22 10:22)


Ua Culture If Indicated (22 10:22)


Alcohol (22 10:22)


Drug Screen Stat (Urine) (22 10:22)


Acetaminophen (22 10:22)


Salicylate (22 10:22)


Bh Status Checks/Observation Q15M (22 10:22)


Hs C Reactive Protein (22 10:22)


Procalcitonin (Pct) (22 10:22)


Naloxone Injection (Narcan Injection) (22 10:30)


Covid 19 Inhouse Test (22 10:26)


Influenza A And B By Pcr (22 10:26)


Ondansetron Injection (Zofran Injectio (22 10:30)


Manual Differential (22 10:23)


Ct Head/Cervical Spine Wo (22 11:13)


Piperacillin Sodium/Tazobactam (Zosyn Vi (22 11:15)


Creatine Kinase (22 11:19)


Lactated Ringers (Lr 1000 Ml Iv Solution (22 11:30)


Promethazine Injection (Phenergan Injec (22 12:54)


Promethazine Injection (Phenergan Injec (22 13:45)





Medications Given in ED





Current Medications








 Medications  Dose


 Ordered  Sig/Blessing


 Route  Start Time


 Stop Time Status Last Admin


Dose Admin


 


 Lactated Ringer's  1,000 ml @ 


 0 mls/hr  Q0M ONCE


 IV  22 11:30


 22 11:31 DC 22 12:55


1,000 MLS/HR


 


 Naloxone HCl  1 mg  ONCE  ONCE


 IV  22 10:30


 22 10:31 DC 22 10:24


1 MG


 


 Ondansetron HCl  8 mg  ONCE  ONCE


 IVP  22 10:30


 22 10:31 DC 22 10:31


8 MG


 


 Piperacillin Sod/


 Tazobactam Sod


 4.5 gm/Sodium


 Chloride  100 ml @ 


 200 mls/hr  ONCE  ONCE


 IV  22 11:15


 22 11:44 DC 22 11:33


200 MLS/HR


 


 Promethazine HCl  12.5 mg  ONCE  ONCE


 IVP  22 13:45


 22 13:46 DC 22 12:55


12.5 MG








Vital Signs/I&O











 22





 10:09 13:10


 


Temp 34.8 


 


Pulse 96 


 


Resp 13 


 


B/P (MAP) 114/59 (77) 


 


Pulse Ox 100 


 


O2 Delivery Non Rebreather Nasal Cannula


 


O2 Flow Rate  2.00














Blood Pressure Mean:                    77











Progress


Progress Note :  


   Time:  14:14


Progress Note


Patient was seen and examined.  Oxygen saturation was 100% during assessment.  

He demanded the mask be removed.  He maintained oxygen saturation in the mid to 

upper 90s on room air after that point until he received Phenergan.  He then 

required 2 L by nasal cannula.  C-collar was applied as presence of trauma was 

unknown.  CT of the head and cervical spine revealed no acute injuries.  C-

collar was cleared.  IV fluids were infused.  Because he coughed up bloody 

sputum, he was tested for COVID-19 and influenza.  These were negative.  As well

as a chest x-ray.  He was treated with Zosyn as a precaution as he may have 

early pneumonia or aspiration.  He did receive an additional Narcan 1 mg dose 

for a total of 3 mg of Narcan administered between EMS and the ER.  Case was 

reviewed with Dr. Hernandes who accepted admission.


Initial ECG Impression Date:  May 20, 2022


Initial ECG Impression Time:  10:43


Initial ECG Rate:  92


Initial ECG Rhythm:  Normal Sinus


Comment


Sinus rhythm with no ST elevation or depression.  No abnormal intervals or axis 

deviation.





Diagnostic Imaging





   Diagonstic Imaging:  CT


   Plain Films/CT/US/NM/MRI:  c-spine, head


Comments


CT head and cervical spine viewed by me and draft report reviewed.  See report 

below:





NAME:   ELAINA AGOSTO


MED REC#:   [id number]


ACCOUNT#:   [id number]


PT STATUS:   REG ER


:   1985


PHYSICIAN:   KARIN MONROY MD


ADMIT DATE:   22/ER


***Draft***


Date of Exam:22





CT HEAD/CERVICAL SPINE WO








PROCEDURE: CT head and CT cervical spine without contrast.





TECHNIQUE: Multiple contiguous axial images were obtained through


the brain and cervical spine without the use of intravenous


contrast. Sagittal and coronal reformations through the cervical


spine were then performed. Auto Exposure Controls were utilized


during the CT exam to meet ALARA standards for radiation dose


reduction. 





INDICATION: Lethargy, found down.





COMPARISON: I have no priors.





FINDINGS:





CT HEAD: There is no hemorrhage, hydrocephalus, edema, mass, mass


effect, nor evidence for an elevation of the intracerebral


pressures. There are no abnormal extra-axial collections.


Gray-white matter differentiation is maintained. No sulcal


effacement. The basilar cisterns are patent. There is no


pneumocephalus. There is no calvarial fracture deformity. There


is no hemo-sinus.





CT CERVICAL SPINE: There is a smooth well-corticated concavity to


the mid to anterior thirds of the superior endplate of T1 with


associated C7-T1 spondylosis and some bridging osteophytosis.


There was no adjacent edema or hemorrhage. This appears chronic.


No acute appearing fracture found, and the cervical statures


themselves were normal. The facet relationships are normal. There


is no substantial canal stenosis. Thoracic inlet and visualized


pulmonary apices are nonacute. No identifiable neck mass or fluid


collection.





IMPRESSION:





CT HEAD: Negative.





CT CERVICAL SPINE: Old deformities and degenerative changes to


the cervicothoracic junction as described. No acute appearing


fracture or traumatic malalignment.





  Dictated on workstation # [id number]





Dict:   22 [id number]


Trans:   22 1226


 [id number]





Interpreted by:     BRAXTON RAVI








   Diagonstic Imaging:  Xray


   Plain Films/CT/US/NM/MRI:  chest


Comments


Chest x-ray viewed by me and report reviewed.  See report below:





NAME:   ELAINA AGOSTO


MED REC#:   [id number]


ACCOUNT#:   [id number]


PT STATUS:   REG ER


:   1985


PHYSICIAN:   KARIN MONROY MD


ADMIT DATE:   22/ER


                                   ***Draft***


Date of Exam:22





CHEST 1 VIEW, AP/PA ONLY








INDICATION: Cough and hypoxia, found unresponsive.





Frontal chest obtained at 10:42 a.m. There is no prior study for


comparison.





FINDINGS: There is cardiomegaly. There is poor inspiration with


mild central vascular prominence. There is some minimal


atelectatic change or infiltrate in the left base.





IMPRESSION:





Poor inspiration with cardiomegaly and mild central vascular


prominence. Minimal infiltrate or atelectasis in left base. No


pneumothorax seen.





  Dictated on workstation # [id number]








Dict:   22 [id number]


Trans:   22 1057


 [id number]





Interpreted by:     KARMEN SORENSEN MD





Departure


Communication (Admissions)


Time/Spoke to Admitting Phy:  13:45


Dr. Hernandes





Impression





   Primary Impression:  


   Altered mental status


   Qualified Codes:  R40.1 - Stupor


   Additional Impressions:  


   Polysubstance abuse


   Nausea & vomiting


   Qualified Codes:  R11.2 - Nausea with vomiting, unspecified


Disposition:   ADMITTED AS INPATIENT


Condition:  Stable





Admissions


Decision to Admit Reason:  Admit from ER (General)


Decision to Admit/Date:  May 20, 2022


Time/Decision to Admit Time:  13:45





Departure-Patient Inst.


Referrals:  


[facility] (PCP/Family)


Primary Care Physician











KARIN MONROY MD        May 20, 2022 13:52

## 2022-05-20 NOTE — DIAGNOSTIC IMAGING REPORT
PROCEDURE: CT head and CT cervical spine without contrast.



TECHNIQUE: Multiple contiguous axial images were obtained through

the brain and cervical spine without the use of intravenous

contrast. Sagittal and coronal reformations through the cervical

spine were then performed. Auto Exposure Controls were utilized

during the CT exam to meet ALARA standards for radiation dose

reduction. 



INDICATION: Lethargy, found down.



COMPARISON: I have no priors.



FINDINGS:



CT HEAD: There is no hemorrhage, hydrocephalus, edema, mass, mass

effect, nor evidence for an elevation of the intracerebral

pressures. There are no abnormal extra-axial collections.

Gray-white matter differentiation is maintained. No sulcal

effacement. The basilar cisterns are patent. There is no

pneumocephalus. There is no calvarial fracture deformity. There

is no hemo-sinus.



CT CERVICAL SPINE: There is a smooth well-corticated concavity to

the mid to anterior thirds of the superior endplate of T1 with

associated C7-T1 spondylosis and some bridging osteophytosis.

There was no adjacent edema or hemorrhage. This appears chronic.

No acute appearing fracture found, and the cervical statures

themselves were normal. The facet relationships are normal. There

is no substantial canal stenosis. Thoracic inlet and visualized

pulmonary apices are nonacute. No identifiable neck mass or fluid

collection.



IMPRESSION:



CT HEAD: Negative.



CT CERVICAL SPINE: Old deformities and degenerative changes to

the cervicothoracic junction as described. No acute appearing

fracture or traumatic malalignment.



Dictated by: 



  Dictated on workstation # [id number]

## 2022-05-20 NOTE — TELE-ICU CONSULT
History of Present Illness


History of Present Illness


Date Seen by Provider:  May 20, 2022


Time Seen by Provider:  15:19


Date of Admission








(Tele-ICU Physician ,  consultation) 





Available chart/ vitals / labs / Images reviewed 


H&P is from ER notes 


Patient's information available about PMH, Shx, Fhx  allergy reviewed in EMR.  


ROS as per chart and RN report  





Now in ICU, hemodynamically stable , sleeping on 5 L o2  


Video assessment done using  teleICU camera, rest of exam as per RN  





Discussed with RN. 





A/P 


Altered mental status 


 - most likely  OD  ( as per ER MD - patient does not know what substance he 

injected) ,  CTH WNL 


 improved with narcan - follow closely , more narcan prn 





Hypoxia  70% on RA when  minimally responsive


- improved , on 5 l o2 


-cxr clear 





Leukocytosis 


- reported cough with  secretion - empiric abx started 


- lactate elev - multifactorial with hypoxia 





Polysubstance abuse 


 -  known to be involved with heroin, cocaine, methamphetamine, marijuana, 

fentanyl, etc. 





Found unresponsive with neck bended 


- CT neck in ER - no  trauma 





Lines :    (Central Line Necessity Reviewed) 


Quinteros:  void 


OG: 


Nutrition:  





Analgesia:  


Anxiety/ delirium  





VTE Prophylaxis:  scd


Stress Ulcer Prophylaxis:  ppi 








Plans in collaboration with  bedside consultants and IM MDs. 


Discussed with RN to reach out if any questions or concerns 


A total of 31  minutes of critical care time was devoted to this patient today, 

required to treat and/or prevent further deterioration of critical care 

condition ( as above ) .





Allergies and Home Medications


Allergies


Coded Allergies:  


     No Known Drug Allergies (Verified , 8/26/09)





Home Medications


Amoxicillin 500 Mg Capsule, 500 MG PO TID


   Prescribed by: PETEY FITCH on 9/21/21 1845


Cyclobenzaprine HCl 10 Mg Tablet, 10 MG PO Q8H PRN for SPASMS


   Prescribed by: NABILA GRIFFIN on 8/1/21 2017


Prednisone 20 Mg Tab, 40 MG PO DAILY


   Prescribed by: PETEY FITCH on 9/21/21 1845


Valacyclovir HCl 1,000 Mg Tablet, 1,000 MG PO BID


   Prescribed by: PETEY FITCH on 9/23/21 1833





Past Medical/Social/Family Hx


Patient Social History


Tobacco Use?:  Yes


Substance use?:  Yes


Substance type:  Methamphetamine, Marijuana, Other (Heroin, cocaine)


PER PATIENT HE DOES NOT USE ANY SUBSTANCES


Alcohol Use?:  No (Not known)


Pt stated abuse/neglect:  No





Immunizations Up To Date


Influenza Vaccine Up-to-Date:  No; Not Current


Tetanus Booster (TDap):  Unknown





Current Status


Advance Directives:  No


Communicates:  Verbally


Primary Language:  English


Preferred Spoken Language:  English





Review of Systems


Constitutional:  see HPI





Focused Exam


Sepsis Stage:  Sepsis


Lactate Level


5/20/22 10:23: Lactic Acid Level 2.47*H


Height, Weight, BMI


Height: 6'5"


Weight: 250lbs. 6.0oz. 113.079643zy; 35.45 BMI


Method:Stated





Exam


Exam


Patient acknowledged, consented, and participated in this virtual visit which 

was conducted using real time audio/video


Vital Signs








  Date Time  Temp Pulse Resp B/P (MAP) Pulse Ox O2 Delivery O2 Flow Rate FiO2


 


5/20/22 15:03      Nasal Cannula 5.00 


 


5/20/22 14:30  82 11 123/80 (94) 90 Nasal Cannula 5.00 


 


5/20/22 14:24  83 12 107/68 99 Nasal Cannula 2.00 


 


5/20/22 14:18  76      


 


5/20/22 13:10      Nasal Cannula 2.00 


 


5/20/22 10:09 34.8 96 13 114/59 (77) 100 Non Rebreather  








Height & Weight


Height: 6'5"


Weight: 250lbs. 6.0oz. 113.185780fo; 35.45 BMI


Method:Stated


General Appearance:  No Apparent Distress


Gastrointestinal:  normal bowel sounds, non tender, soft





Results


Lab


Laboratory Tests


5/20/22 10:23











Assessment/Plan


Assessment/Plan


1











DANIEL CORREA MD         May 20, 2022 15:20

## 2022-05-21 VITALS — DIASTOLIC BLOOD PRESSURE: 82 MMHG | SYSTOLIC BLOOD PRESSURE: 138 MMHG

## 2022-05-21 VITALS — DIASTOLIC BLOOD PRESSURE: 87 MMHG | SYSTOLIC BLOOD PRESSURE: 131 MMHG

## 2022-05-21 VITALS — DIASTOLIC BLOOD PRESSURE: 71 MMHG | SYSTOLIC BLOOD PRESSURE: 110 MMHG

## 2022-05-21 VITALS — DIASTOLIC BLOOD PRESSURE: 78 MMHG | SYSTOLIC BLOOD PRESSURE: 102 MMHG

## 2022-05-21 VITALS — DIASTOLIC BLOOD PRESSURE: 63 MMHG | SYSTOLIC BLOOD PRESSURE: 118 MMHG

## 2022-05-21 VITALS — SYSTOLIC BLOOD PRESSURE: 120 MMHG | DIASTOLIC BLOOD PRESSURE: 78 MMHG

## 2022-05-21 VITALS — SYSTOLIC BLOOD PRESSURE: 113 MMHG | DIASTOLIC BLOOD PRESSURE: 68 MMHG

## 2022-05-21 VITALS — SYSTOLIC BLOOD PRESSURE: 110 MMHG | DIASTOLIC BLOOD PRESSURE: 69 MMHG

## 2022-05-21 VITALS — DIASTOLIC BLOOD PRESSURE: 63 MMHG | SYSTOLIC BLOOD PRESSURE: 78 MMHG

## 2022-05-21 VITALS — DIASTOLIC BLOOD PRESSURE: 75 MMHG | SYSTOLIC BLOOD PRESSURE: 108 MMHG

## 2022-05-21 VITALS — SYSTOLIC BLOOD PRESSURE: 92 MMHG | DIASTOLIC BLOOD PRESSURE: 74 MMHG

## 2022-05-21 VITALS — DIASTOLIC BLOOD PRESSURE: 64 MMHG | SYSTOLIC BLOOD PRESSURE: 118 MMHG

## 2022-05-21 VITALS — DIASTOLIC BLOOD PRESSURE: 76 MMHG | SYSTOLIC BLOOD PRESSURE: 114 MMHG

## 2022-05-21 VITALS — SYSTOLIC BLOOD PRESSURE: 101 MMHG | DIASTOLIC BLOOD PRESSURE: 65 MMHG

## 2022-05-21 VITALS — DIASTOLIC BLOOD PRESSURE: 70 MMHG | SYSTOLIC BLOOD PRESSURE: 109 MMHG

## 2022-05-21 VITALS — SYSTOLIC BLOOD PRESSURE: 107 MMHG | DIASTOLIC BLOOD PRESSURE: 78 MMHG

## 2022-05-21 VITALS — DIASTOLIC BLOOD PRESSURE: 75 MMHG | SYSTOLIC BLOOD PRESSURE: 120 MMHG

## 2022-05-21 LAB
ALBUMIN SERPL-MCNC: 3.4 GM/DL (ref 3.2–4.5)
ALP SERPL-CCNC: 67 U/L (ref 40–136)
ALT SERPL-CCNC: 31 U/L (ref 0–55)
BASOPHILS # BLD AUTO: 0 10^3/UL (ref 0–0.1)
BASOPHILS NFR BLD AUTO: 0 % (ref 0–10)
BILIRUB SERPL-MCNC: 0.5 MG/DL (ref 0.1–1)
BUN/CREAT SERPL: 12
CALCIUM SERPL-MCNC: 8.5 MG/DL (ref 8.5–10.1)
CHLORIDE SERPL-SCNC: 104 MMOL/L (ref 98–107)
CK SERPL-CCNC: 381 U/L (ref 30–200)
CO2 SERPL-SCNC: 21 MMOL/L (ref 21–32)
CREAT SERPL-MCNC: 0.76 MG/DL (ref 0.6–1.3)
EOSINOPHIL # BLD AUTO: 0.2 10^3/UL (ref 0–0.3)
EOSINOPHIL NFR BLD AUTO: 2 % (ref 0–10)
GFR SERPLBLD BASED ON 1.73 SQ M-ARVRAT: 121 ML/MIN
GLUCOSE SERPL-MCNC: 94 MG/DL (ref 70–105)
HCT VFR BLD CALC: 42 % (ref 40–54)
HGB BLD-MCNC: 13.3 G/DL (ref 13.3–17.7)
LYMPHOCYTES # BLD AUTO: 2.4 10^3/UL (ref 1–4)
LYMPHOCYTES NFR BLD AUTO: 26 % (ref 12–44)
MAGNESIUM SERPL-MCNC: 1.8 MG/DL (ref 1.6–2.4)
MANUAL DIFFERENTIAL PERFORMED BLD QL: NO
MCH RBC QN AUTO: 29 PG (ref 25–34)
MCHC RBC AUTO-ENTMCNC: 32 G/DL (ref 32–36)
MCV RBC AUTO: 91 FL (ref 80–99)
MONOCYTES # BLD AUTO: 1.3 10^3/UL (ref 0–1)
MONOCYTES NFR BLD AUTO: 13 % (ref 0–12)
NEUTROPHILS # BLD AUTO: 5.5 10^3/UL (ref 1.8–7.8)
NEUTROPHILS NFR BLD AUTO: 58 % (ref 42–75)
PHOSPHATE SERPL-MCNC: 3.2 MG/DL (ref 2.3–4.7)
PLATELET # BLD: 274 10^3/UL (ref 130–400)
PMV BLD AUTO: 8.6 FL (ref 9–12.2)
POTASSIUM SERPL-SCNC: 3.8 MMOL/L (ref 3.6–5)
PROT SERPL-MCNC: 6 GM/DL (ref 6.4–8.2)
SODIUM SERPL-SCNC: 136 MMOL/L (ref 135–145)
WBC # BLD AUTO: 9.4 10^3/UL (ref 4.3–11)

## 2022-05-21 RX ADMIN — SODIUM CHLORIDE SCH MLS/HR: 900 INJECTION INTRAVENOUS at 08:50

## 2022-05-21 RX ADMIN — SODIUM CHLORIDE SCH MLS/HR: 900 INJECTION INTRAVENOUS at 01:58

## 2022-05-21 RX ADMIN — SODIUM CHLORIDE, SODIUM LACTATE, POTASSIUM CHLORIDE, AND CALCIUM CHLORIDE SCH MLS/HR: 600; 310; 30; 20 INJECTION, SOLUTION INTRAVENOUS at 05:59

## 2022-05-21 NOTE — TELE-ICU PROGRESS NOTE
Subjective


Date Seen by a Provider:  May 21, 2022


Time Seen by a Provider:  10:52


Subjective/Events-last exam


Drug OD polysubstance abuse, now more alert, still having episodes of lethargy, 

will stay in MICU for at least one mroe day


found unresponsive at home with SpO2 in 70's, UDS + for methamphetamines


CXR showed some sub segmental atelectasis.





Sepsis Event


Evaluation


Height, Weight, BMI


Height: 6'5"


Weight: 250lbs. 6.0oz. 113.065552av; 38.31 BMI


Method:Stated





Focused Exam


Lactate Level


5/20/22 10:23: Lactic Acid Level 2.47*H


5/20/22 18:00: Lactic Acid Level 1.58





Exam


Exam


Patient acknowledged, consented, and participated in this virtual visit which 

was conducted using real time audio/video


Vital Signs








  Date Time  Temp Pulse Resp B/P (MAP) Pulse Ox O2 Delivery O2 Flow Rate FiO2


 


5/21/22 10:00  68 15 114/76 (89) 96 High Flow N/C 4.00 


 


5/21/22 09:00  82 21 113/68 (83) 94 High Flow N/C 4.00 


 


5/21/22 08:00     90 Room Air  


 


5/21/22 08:00  80 19 101/65 (77) 93 High Flow N/C 4.00 


 


5/21/22 07:33 36.2       


 


5/21/22 07:00  77 22 120/78 (92) 99 High Flow N/C 4.00 


 


5/21/22 07:00  77      


 


5/21/22 06:02      High Flow N/C 4.00 


 


5/21/22 06:00  65 23 107/78 (88) 100 Nasal Cannula 10.00 


 


5/21/22 05:36      Nasal Cannula 10.00 


 


5/21/22 05:00  76  109/70 (83) 92 Nasal Cannula 2.00 


 


5/21/22 04:00  83  102/78 (86)  Nasal Cannula 2.00 


 


5/21/22 04:00      Nasal Cannula 5.00 


 


5/21/22 04:00 36.3       


 


5/21/22 03:00  80 22 108/75 (86)  Nasal Cannula 2.00 


 


5/21/22 02:00  77  110/71 (84)  Nasal Cannula 2.00 


 


5/21/22 01:00  82      


 


5/21/22 01:00  84 14 110/69 (83)  Nasal Cannula 2.00 


 


5/21/22 00:00  64 28 118/63 (81)  Nasal Cannula 2.00 


 


5/21/22 00:00      Nasal Cannula 2.00 


 


5/20/22 23:39 36.2       


 


5/20/22 23:00  81 30 106/62 (77) 92 Nasal Cannula 2.00 


 


5/20/22 22:00  86 24 110/65 (80) 92 Room Air  


 


5/20/22 21:00  73 14 107/67 (80) 98 Room Air  


 


5/20/22 20:00      Nasal Cannula 2.00 


 


5/20/22 20:00  83 15 109/72 (84) 98 Room Air  


 


5/20/22 19:00  81 17 101/68 (79) 95 Room Air  


 


5/20/22 19:00  82      


 


5/20/22 18:30  73 14  99 Room Air  


 


5/20/22 18:00  77 21 106/63 (77) 96 Room Air  


 


5/20/22 17:30  81 12  98 Room Air  


 


5/20/22 17:00  67 10  98 Room Air  


 


5/20/22 16:30  71 12 109/79 (89) 94 Room Air  


 


5/20/22 16:00  92 9 105/67 (80) 91 Room Air  


 


5/20/22 15:54 36.3       


 


5/20/22 15:44   10  92 Room Air  


 


5/20/22 15:30  82 11 116/79 (91) 93 Nasal Cannula 5.00 


 


5/20/22 15:03      Nasal Cannula 5.00 


 


5/20/22 15:00  76 10 119/77 (91) 91 Nasal Cannula 5.00 


 


5/20/22 14:30  82 11 123/80 (94) 90 Nasal Cannula 5.00 


 


5/20/22 14:24  83 12 107/68 99 Nasal Cannula 2.00 


 


5/20/22 14:18  76      


 


5/20/22 13:10      Nasal Cannula 2.00 














I & O 


 


 5/21/22





 07:00


 


Intake Total 8500 ml


 


Output Total 2025 ml


 


Balance 6475 ml








Height & Weight


Height: 6'5"


Weight: 250lbs. 6.0oz. 113.266480lk; 38.31 BMI


Method:Stated


General Appearance:  No Apparent Distress, Other (lethargic at times)


Respiratory:  Lungs Clear


Cardiovascular:  Regular Rate, Rhythm, No Edema


Gastrointestinal:  normal bowel sounds, non tender, soft


Neurologic/Psychiatric:  Oriented x3





Results


Lab


Laboratory Tests


5/20/22 10:23








5/21/22 05:05











Assessment/Plan


Assessment/Plan


Recovering after methamphetamine, OD, still not completely awake, will keep in 

MICU one more day


Critical Care:  Critically Ill Patient


Time spent with patient (mins):  20











EARNESTINE EISENBERG MD             May 21, 2022 10:57

## 2022-05-21 NOTE — SHORT STAY SUMMARY-HOSPITALIST
History of Present Illness


HPI/Chief Complaint


This 36-year-old man is brought to the emergency room by [facility] 

with chief complaint of unresponsive episode.  EMS reports he was found lying on

the floor face up with his head against a chair such that his neck was flexed 

anteriorly.  He was hypoxic with an oxygen saturation of 70% on room air and 

minimally responsive.  Narcan 2 mg IV was administered and nonrebreather mask 

was applied.  Mental status was improving, he was protecting his airway, and 

oxygen saturation improved.  Multiple injection syringes were found on the floor

around him and track marks were noted on his arms.  Police noted that this 

patient has been known to be involved with heroin, cocaine, methamphetamine, 

marijuana, fentanyl, etc.  Patient eventually wakes and talks to us during 

assessment.  He states he does not know what substance he injected.  He was not 

very responsive until we attempted to place a Quinteros catheter and then he became 

very talkative and responsive.  There is no known trauma but c-collar was placed

as a precaution.  


Upon my arrival the patient was sleeping did arouse and can tell me that he is 

in the [facility] and was not sure what happened to them yesterday.  

Answers are short and trey for the most part keeping his eyes closed.  Staff 

report that he has independently been up to use the bathroom and did not appear 

to be agitated or confused but otherwise 1 to be left alone to sleep.  He did 

not appear to be in acute distress.


Date Seen


5/21/22


Time Seen by a Provider:  07:00


Attending Physician


[facility]


PCP


Admitting Physician:


Alla Sharp MD 








Attending Physician:


Alla Sharp MD


Referring Physician





Date of Admission


May 20, 2022 at 13:45





Home Medications & Allergies


Home Medications


Reviewed patient Home Medication Reconciliation performed by pharmacy medication

reconciliations technician and/or nursing.


Patients Allergies have been reviewed.





Allergies





Allergies


Coded Allergies


  No Known Drug Allergies (Verified8/26/09)








Past Medical-Social-Family Hx


Patient Social History


Tobacco Use?:  Yes


Substance use?:  Yes


Substance type:  Methamphetamine, Marijuana, Other (Heroin, cocaine)


Additional substance use comme:  PER PATIENT HE DOES NOT USE ANY SUBSTANCES


Alcohol Use?:  No (Not known)


Pt feels they are or have been:  No





Immunizations Up To Date


Tetanus Booster (TDap):  Unknown





Current Status


Advance Directives:  No


Communicates:  Verbally


Primary Language:  English


Preferred Spoken Language:  English





Past Medical History


Sexually Transmitted Disease:  No


ADD/ADHD





Review of Systems


Constitutional:  see HPI





Physical Exam


Physical Exam


Vital Signs





Vital Signs - First Documented








 5/20/22 5/20/22





 10:09 13:10


 


Temp 34.8 


 


Pulse 96 


 


Resp 13 


 


B/P (MAP) 114/59 (77) 


 


Pulse Ox 100 


 


O2 Delivery Non Rebreather 


 


O2 Flow Rate  2.00





Capillary Refill :


Height, Weight, BMI


Height: 6'5"


Weight: 250lbs. 6.0oz. 113.918773yo; 38.31 BMI


Method:Stated


General Appearance:  No Apparent Distress, Other (lethargic at times)


Respiratory:  Lungs Clear, Normal Breath Sounds, No Accessory Muscle Use, No 

Respiratory Distress


Cardiovascular:  Regular Rate, Rhythm, No Edema, No Murmur


Gastrointestinal:  Normal Bowel Sounds, No Organomegaly, No Pulsatile Mass, Non 

Tender, Soft


Extremity:  Normal Capillary Refill, Normal Inspection, Normal Range of Motion, 

Non Tender, No Calf Tenderness, No Pedal Edema


Neurologic/Psychiatric:  Oriented x3, Other (Moving all extremities no focal 

deficits noted)





Results


Results/Procedures


Labs


Laboratory Tests


5/20/22 10:23








5/21/22 05:05








Patient resulted labs reviewed.





Short Stay Diagnosis


Discharge Diagnosis-Short Stay


Admission Diagnosis


1.  Drug overdose


2.  Mild anoxic encephalopathy improving.  Discussed with the patient that he 

was nearly an overdose death statistic and was fortunate to be alive as well as 

fortunate to be alive and not  severely impaired.  Currently he is sleeping 

office late as a drug binge possibly aggravated by some mild anoxic 

encephalopathy provided that he is awake and alert later will plan discharge.  

The patient is not interested in any form of drug rehabilitation at this time.  

We will have staff again recommend and give the social service number that he 

can call if he changes his mind to discuss options for his narcotics addiction.


Final Discharge Diagnosis


Same as above





Conclusion


Plan


Same as admission diagnosis











ALLA SHARP MD              May 21, 2022 11:08

## 2022-05-21 NOTE — DIAGNOSTIC IMAGING REPORT
EXAMINATION: Chest radiograph, portable AP view.



DATE: 5/21/2022 6:40 AM



INDICATION: 36-year-old male, altered mental status. Substance

abuse.



COMPARISON:  May 20, 2022.



FINDINGS: Heart size and mediastinal contours are unchanged.

There is no identified pneumothorax. There are streaky opacities

in the lung bases which are essentially unchanged since the

comparison study. Lung volumes are low and unchanged. There is

evidence of a prior right acromioclavicular joint separation

injury with evidence of remote prior injury of the

coracoclavicular ligaments on the right.



IMPRESSION: 

1. Unchanged streaky opacities in the lung bases which may relate

to infiltrate and/or atelectasis.

2. Unchanged low lung volumes.



Dictated by: 



  Dictated on workstation # [id number]